# Patient Record
Sex: FEMALE | Race: WHITE | Employment: OTHER | URBAN - METROPOLITAN AREA
[De-identification: names, ages, dates, MRNs, and addresses within clinical notes are randomized per-mention and may not be internally consistent; named-entity substitution may affect disease eponyms.]

---

## 2017-02-08 ENCOUNTER — CLINICAL SUPPORT (OUTPATIENT)
Dept: CARDIOLOGY CLINIC | Age: 57
End: 2017-02-08

## 2017-02-08 DIAGNOSIS — R07.89 CHEST DISCOMFORT: Primary | ICD-10-CM

## 2017-02-08 DIAGNOSIS — E78.5 HYPERLIPIDEMIA, UNSPECIFIED HYPERLIPIDEMIA TYPE: ICD-10-CM

## 2017-02-09 ENCOUNTER — TELEPHONE (OUTPATIENT)
Dept: CARDIOLOGY CLINIC | Age: 57
End: 2017-02-09

## 2017-02-09 ENCOUNTER — DOCUMENTATION ONLY (OUTPATIENT)
Dept: CARDIOLOGY CLINIC | Age: 57
End: 2017-02-09

## 2017-02-09 NOTE — TELEPHONE ENCOUNTER
lft msg for pt that if any symptoms bothering her to keep up coming appt - if not to reschedule in 6 month.

## 2017-02-09 NOTE — TELEPHONE ENCOUNTER
Pt called and stated she received your message regarding her test results. She wants to verify if she needs to keep her f/u appointment scheduled on February 16, 2017 at 11:20. Please call her at [phone]. Thanks.

## 2017-07-20 ENCOUNTER — HOSPITAL ENCOUNTER (OUTPATIENT)
Dept: MAMMOGRAPHY | Age: 57
Discharge: HOME | End: 2017-07-20
Payer: COMMERCIAL

## 2017-07-20 DIAGNOSIS — Z12.31 VISIT FOR SCREENING MAMMOGRAM: ICD-10-CM

## 2017-07-20 PROCEDURE — 77067 SCR MAMMO BI INCL CAD: CPT

## 2018-07-23 ENCOUNTER — HOSPITAL ENCOUNTER (OUTPATIENT)
Dept: MAMMOGRAPHY | Age: 58
Discharge: HOME | End: 2018-07-23
Attending: FAMILY MEDICINE
Payer: COMMERCIAL

## 2018-07-23 DIAGNOSIS — Z12.39 BREAST SCREENING: ICD-10-CM

## 2018-07-23 PROCEDURE — 77067 SCR MAMMO BI INCL CAD: CPT

## 2019-07-26 ENCOUNTER — HOSPITAL ENCOUNTER (OUTPATIENT)
Dept: MAMMOGRAPHY | Age: 59
Discharge: HOME | End: 2019-07-26
Attending: FAMILY MEDICINE
Payer: COMMERCIAL

## 2019-07-26 DIAGNOSIS — Z12.39 SCREENING BREAST EXAMINATION: ICD-10-CM

## 2019-07-26 PROCEDURE — 77067 SCR MAMMO BI INCL CAD: CPT

## 2020-05-28 ENCOUNTER — DOCUMENTATION ONLY (OUTPATIENT)
Dept: CARDIOLOGY CLINIC | Age: 60
End: 2020-05-28

## 2020-06-24 ENCOUNTER — TELEPHONE (OUTPATIENT)
Dept: CARDIOLOGY CLINIC | Age: 60
End: 2020-06-24

## 2020-08-28 ENCOUNTER — HOSPITAL ENCOUNTER (OUTPATIENT)
Dept: MAMMOGRAPHY | Age: 60
Discharge: HOME | End: 2020-08-28
Attending: FAMILY MEDICINE
Payer: COMMERCIAL

## 2020-08-28 DIAGNOSIS — Z12.31 VISIT FOR SCREENING MAMMOGRAM: ICD-10-CM

## 2020-08-28 PROCEDURE — 77067 SCR MAMMO BI INCL CAD: CPT

## 2021-03-10 ENCOUNTER — HOSPITAL ENCOUNTER (EMERGENCY)
Age: 61
Discharge: HOME | End: 2021-03-10
Attending: EMERGENCY MEDICINE | Admitting: EMERGENCY MEDICINE
Payer: COMMERCIAL

## 2021-03-10 VITALS
HEART RATE: 79 BPM | SYSTOLIC BLOOD PRESSURE: 142 MMHG | OXYGEN SATURATION: 95 % | DIASTOLIC BLOOD PRESSURE: 79 MMHG | TEMPERATURE: 97.3 F | RESPIRATION RATE: 16 BRPM

## 2021-03-10 DIAGNOSIS — K62.5 RECTAL BLEEDING: Primary | ICD-10-CM

## 2021-03-10 LAB
ALBUMIN SERPL-MCNC: 4.4 G/DL (ref 3.5–5)
ALBUMIN/GLOB SERPL: 1.2 {RATIO} (ref 1.1–2.2)
ALP SERPL-CCNC: 62 U/L (ref 45–117)
ALT SERPL-CCNC: 29 U/L (ref 12–78)
ANION GAP SERPL CALC-SCNC: 7 MMOL/L (ref 5–15)
AST SERPL-CCNC: 14 U/L (ref 15–37)
BASOPHILS # BLD: 0.1 K/UL (ref 0–0.1)
BASOPHILS NFR BLD: 1 % (ref 0–1)
BILIRUB SERPL-MCNC: 0.6 MG/DL (ref 0.2–1)
BUN SERPL-MCNC: 15 MG/DL (ref 6–20)
BUN/CREAT SERPL: 20 (ref 12–20)
CALCIUM SERPL-MCNC: 10.4 MG/DL (ref 8.5–10.1)
CHLORIDE SERPL-SCNC: 107 MMOL/L (ref 97–108)
CO2 SERPL-SCNC: 24 MMOL/L (ref 21–32)
COMMENT, HOLDF: NORMAL
CREAT SERPL-MCNC: 0.75 MG/DL (ref 0.55–1.02)
DIFFERENTIAL METHOD BLD: NORMAL
EOSINOPHIL # BLD: 0.1 K/UL (ref 0–0.4)
EOSINOPHIL NFR BLD: 1 % (ref 0–7)
ERYTHROCYTE [DISTWIDTH] IN BLOOD BY AUTOMATED COUNT: 13.5 % (ref 11.5–14.5)
GLOBULIN SER CALC-MCNC: 3.8 G/DL (ref 2–4)
GLUCOSE SERPL-MCNC: 109 MG/DL (ref 65–100)
HCT VFR BLD AUTO: 41.2 % (ref 35–47)
HGB BLD-MCNC: 13.6 G/DL (ref 11.5–16)
IMM GRANULOCYTES # BLD AUTO: 0 K/UL (ref 0–0.04)
IMM GRANULOCYTES NFR BLD AUTO: 0 % (ref 0–0.5)
LYMPHOCYTES # BLD: 2.1 K/UL (ref 0.8–3.5)
LYMPHOCYTES NFR BLD: 26 % (ref 12–49)
MCH RBC QN AUTO: 27.8 PG (ref 26–34)
MCHC RBC AUTO-ENTMCNC: 33 G/DL (ref 30–36.5)
MCV RBC AUTO: 84.3 FL (ref 80–99)
MONOCYTES # BLD: 0.5 K/UL (ref 0–1)
MONOCYTES NFR BLD: 6 % (ref 5–13)
NEUTS SEG # BLD: 5.3 K/UL (ref 1.8–8)
NEUTS SEG NFR BLD: 66 % (ref 32–75)
NRBC # BLD: 0 K/UL (ref 0–0.01)
NRBC BLD-RTO: 0 PER 100 WBC
PLATELET # BLD AUTO: 309 K/UL (ref 150–400)
PMV BLD AUTO: 9.7 FL (ref 8.9–12.9)
POTASSIUM SERPL-SCNC: 4 MMOL/L (ref 3.5–5.1)
PROT SERPL-MCNC: 8.2 G/DL (ref 6.4–8.2)
RBC # BLD AUTO: 4.89 M/UL (ref 3.8–5.2)
SAMPLES BEING HELD,HOLD: NORMAL
SODIUM SERPL-SCNC: 138 MMOL/L (ref 136–145)
WBC # BLD AUTO: 8.1 K/UL (ref 3.6–11)

## 2021-03-10 PROCEDURE — 80053 COMPREHEN METABOLIC PANEL: CPT

## 2021-03-10 PROCEDURE — 99281 EMR DPT VST MAYX REQ PHY/QHP: CPT

## 2021-03-10 PROCEDURE — 85025 COMPLETE CBC W/AUTO DIFF WBC: CPT

## 2021-03-10 PROCEDURE — 36415 COLL VENOUS BLD VENIPUNCTURE: CPT

## 2021-03-10 NOTE — ED PROVIDER NOTES
HPI .  Patient has a history of pulmonic stenosis. She had a colonoscopy 5 years ago. She does not have any recall of having diverticulosis. Patient has not felt weak or dizzy or like she might faint. She is not losing weight. Patient noted bright red blood today. She had a nonbloody bowel movement followed by a bowel movement with blood in the toilet. She was seen in a primary care doctor office. She had gross blood on rectal exam and Hemoccult was sent. Patient has an appointment with the gastroenterologist in 2 days. This appointment was made because she is having a formed bowel movement each day then followed by a loose bowel movement which is a difference in her usual bowel habits. Patient denies abdominal pain.     Past Medical History:   Diagnosis Date    Contact dermatitis and other eczema, due to unspecified cause     Depression 4/5/2010    Hyperglycemia     recently started on metformin    Pulmonic stenosis        Past Surgical History:   Procedure Laterality Date    HX HYSTERECTOMY      IMPLANT BREAST SILICONE/EQ Bilateral 4014'M         Family History:   Problem Relation Age of Onset    Cancer Mother     Heart Disease Father     Cancer Sister     Breast Cancer Sister 62       Social History     Socioeconomic History    Marital status:      Spouse name: Not on file    Number of children: Not on file    Years of education: Not on file    Highest education level: Not on file   Occupational History    Not on file   Social Needs    Financial resource strain: Not on file    Food insecurity     Worry: Not on file     Inability: Not on file    Transportation needs     Medical: Not on file     Non-medical: Not on file   Tobacco Use    Smoking status: Never Smoker   Substance and Sexual Activity    Alcohol use: No    Drug use: No    Sexual activity: Yes     Partners: Male     Birth control/protection: None   Lifestyle    Physical activity     Days per week: Not on file Minutes per session: Not on file   • Stress: Not on file   Relationships   • Social connections     Talks on phone: Not on file     Gets together: Not on file     Attends Sikhism service: Not on file     Active member of club or organization: Not on file     Attends meetings of clubs or organizations: Not on file     Relationship status: Not on file   • Intimate partner violence     Fear of current or ex partner: Not on file     Emotionally abused: Not on file     Physically abused: Not on file     Forced sexual activity: Not on file   Other Topics Concern   • Not on file   Social History Narrative   • Not on file         ALLERGIES: Patient has no known allergies.    Review of Systems   All other systems reviewed and are negative.      Vitals:    03/10/21 1538   BP: (!) 142/79   Pulse: 79   Resp: 16   Temp: 97.3 °F (36.3 °C)   SpO2: 95%            Physical Exam  Vitals signs and nursing note reviewed.   Constitutional:       Appearance: She is well-developed.   HENT:      Head: Normocephalic and atraumatic.   Eyes:      Pupils: Pupils are equal, round, and reactive to light.   Neck:      Musculoskeletal: Normal range of motion and neck supple.   Cardiovascular:      Rate and Rhythm: Normal rate and regular rhythm.      Heart sounds: Normal heart sounds. No murmur. No friction rub. No gallop.    Pulmonary:      Effort: Pulmonary effort is normal. No respiratory distress.      Breath sounds: No wheezing or rales.   Abdominal:      Palpations: Abdomen is soft.      Tenderness: There is no abdominal tenderness. There is no rebound.   Musculoskeletal: Normal range of motion.         General: No tenderness.   Skin:     Findings: No erythema.   Neurological:      Mental Status: She is alert.      Cranial Nerves: No cranial nerve deficit.      Comments: Motor; symmetric   Psychiatric:         Behavior: Behavior normal.          MDM       Procedures

## 2021-03-10 NOTE — ED TRIAGE NOTES
Triage: Pt arrives from PCP's office with CC of blood in her stool. She reports two episodes of blood stool today. She is unable to quantify the amount of blood but describes it as having been in the water and seeing some clots. She is not on blood thinners. Hx of hemorrhoids. Denies abdominal pain.

## 2021-07-06 ENCOUNTER — TRANSCRIBE ORDER (OUTPATIENT)
Dept: SCHEDULING | Age: 61
End: 2021-07-06

## 2021-07-06 DIAGNOSIS — Z12.31 VISIT FOR SCREENING MAMMOGRAM: Primary | ICD-10-CM

## 2021-08-30 ENCOUNTER — HOSPITAL ENCOUNTER (OUTPATIENT)
Dept: MAMMOGRAPHY | Age: 61
Discharge: HOME | End: 2021-08-30
Attending: FAMILY MEDICINE
Payer: COMMERCIAL

## 2021-08-30 DIAGNOSIS — Z12.31 VISIT FOR SCREENING MAMMOGRAM: ICD-10-CM

## 2021-08-30 PROCEDURE — 77067 SCR MAMMO BI INCL CAD: CPT

## 2022-07-07 ENCOUNTER — TRANSCRIBE ORDER (OUTPATIENT)
Dept: SCHEDULING | Age: 62
End: 2022-07-07

## 2022-07-07 DIAGNOSIS — Z12.31 SCREENING MAMMOGRAM FOR HIGH-RISK PATIENT: Primary | ICD-10-CM

## 2022-08-25 LAB — HBA1C MFR BLD HPLC: 6.1 %

## 2022-08-31 ENCOUNTER — HOSPITAL ENCOUNTER (OUTPATIENT)
Dept: MAMMOGRAPHY | Age: 62
Discharge: HOME | End: 2022-08-31
Attending: FAMILY MEDICINE
Payer: COMMERCIAL

## 2022-08-31 DIAGNOSIS — Z12.31 SCREENING MAMMOGRAM FOR HIGH-RISK PATIENT: ICD-10-CM

## 2022-08-31 PROCEDURE — 77067 SCR MAMMO BI INCL CAD: CPT

## 2023-02-08 ENCOUNTER — OFFICE VISIT (OUTPATIENT)
Dept: CARDIOLOGY CLINIC | Age: 63
End: 2023-02-08
Payer: COMMERCIAL

## 2023-02-08 VITALS
WEIGHT: 181 LBS | OXYGEN SATURATION: 97 % | HEART RATE: 72 BPM | HEIGHT: 67 IN | DIASTOLIC BLOOD PRESSURE: 76 MMHG | BODY MASS INDEX: 28.41 KG/M2 | SYSTOLIC BLOOD PRESSURE: 120 MMHG

## 2023-02-08 DIAGNOSIS — E78.01 FAMILIAL HYPERCHOLESTEROLEMIA: ICD-10-CM

## 2023-02-08 DIAGNOSIS — Z82.49 FAMILY HISTORY OF ATRIAL FLUTTER: Primary | ICD-10-CM

## 2023-02-08 DIAGNOSIS — R60.0 LEG EDEMA: ICD-10-CM

## 2023-02-08 PROCEDURE — 99204 OFFICE O/P NEW MOD 45 MIN: CPT | Performed by: SPECIALIST

## 2023-02-08 PROCEDURE — 93000 ELECTROCARDIOGRAM COMPLETE: CPT | Performed by: SPECIALIST

## 2023-02-08 NOTE — PROGRESS NOTES
Dena Escudero is a 57 y.o. female    Vitals:    02/08/23 1547   BP: 120/76   BP 1 Location: Left upper arm   BP Patient Position: Sitting   BP Cuff Size: Adult   Pulse: 72   Height: 5' 7\" (1.702 m)   Weight: 181 lb (82.1 kg)   SpO2: 97%       Chief Complaint   Patient presents with    Irregular Heart Beat     FLUTTER       Chest pain YES  SOB NO  Dizziness NO  Swelling NO  Recent hospital visit NO  Refills NO  COVID VACCINE YES  HAD COVID?  YES

## 2023-02-08 NOTE — PROGRESS NOTES
CARDIOLOGY OFFICE NOTE    Sid Baez MD, [address], [place], [address] [facility]  Phone [phone]; Fax [phone]  Mobile [phone]   Voice Mail [phone]    Primary care: Yumi Mercer MD       ATTENTION:   This medical record was transcribed using an electronic medical records/speech recognition system. Although proofread, it may and can contain electronic, spelling and other errors. Corrections may be executed at a later time. Please feel free to contact us for any clarifications as needed. Khushbu Loya is a 57 y.o. female with  referred for patient's dyslipidemia          Cardiac risk factors: family history, dyslipidemia, post-menopausal  I have personally obtained the history from the patient. HISTORY OF PRESENTING ILLNESS    Ms./Mr. Khushbu Loya  57 y.o. was last seen in 2016. She has been doing well. She does note that in August she started experiencing some palpitations and since December has not had any further irregularity or rhythm. They were brief in nature. In the past I saw her for questionable history of pulmonary stenosis diagnosed as a child. There is a family history of heart disease.        ACTIVE PROBLEM LIST     Patient Active Problem List    Diagnosis Date Noted    Impaired fasting glucose 04/05/2010    Hyperlipidemia 04/05/2010    Leg edema 04/05/2010    Depression 04/05/2010    Obesity 04/05/2010           PAST MEDICAL HISTORY     Past Medical History:   Diagnosis Date    Contact dermatitis and other eczema, due to unspecified cause     Depression 4/5/2010    Hyperglycemia     recently started on metformin    Pulmonic stenosis            PAST SURGICAL HISTORY     Past Surgical History:   Procedure Laterality Date    HX HYSTERECTOMY      IMPLANT BREAST SILICONE/EQ Bilateral 1158'J          ALLERGIES     No Known Allergies       FAMILY HISTORY     Family History   Problem Relation Age of Onset Cancer Mother     Heart Disease Father     Cancer Sister     Breast Cancer Sister 62    negative for cardiac disease       SOCIAL HISTORY     Social History     Socioeconomic History    Marital status:    Tobacco Use    Smoking status: Never   Substance and Sexual Activity    Alcohol use: No    Drug use: No    Sexual activity: Yes     Partners: Male     Birth control/protection: None         MEDICATIONS     Current Outpatient Medications   Medication Sig    metFORMIN (GLUCOPHAGE) 500 mg tablet Take 1,000 mg by mouth two (2) times daily (with meals). ESCITALOPRAM OXALATE (LEXAPRO PO) Take  by mouth. hydrocortisone (HYTONE) 2.5 % topical cream Apply  to affected area two (2) times a day. use thin layer     No current facility-administered medications for this visit. I have reviewed the nurses notes, vitals, problem list, allergy list, medical history, family, social history and medications. REVIEW OF SYMPTOMS    As per HPI  General: Pt denies excessive weight gain or loss. Pt is able to conduct ADL's  HEENT: Denies blurred vision, headaches, hearing loss, epistaxis and difficulty swallowing. Respiratory: Denies cough, congestion, shortness of breath, MCKOY, wheezing or stridor. Cardiovascular: Denies precordial pain, palpitations, edema or PND  Gastrointestinal: Denies poor appetite, indigestion, abdominal pain or blood in stool  Genitourinary: Denies hematuria, dysuria, increased urinary frequency  Musculoskeletal: Denies joint pain or swelling from muscles or joints  Neurologic: Denies tremor, paresthesias, headache, or sensory motor disturbance  Psychiatric: Denies confusion, insomnia, depression  Integumentray: Denies rash, itching or ulcers. Hematologic: Denies easy bruising, bleeding     PHYSICAL EXAMINATION      Vitals:    02/08/23 1547   BP: 120/76   Pulse: 72   SpO2: 97%   Weight: 181 lb (82.1 kg)   Height: 5' 7\" (1.702 m)     General: Well developed, in no acute distress.   HEENT: No jaundice,  Neck: Supple, no stiffness, no lymphadenopathy, supple  Heart:  Normal S1/S2 negative S3 or S4. Regular, no murmur, gallop or rub, no jugular venous distention  Respiratory: Clear bilaterally x 4, no wheezing or rales  Extremities:  No edema, normal cap refill, no cyanosis. Musculoskeletal: No clubbing, no deformities  Neuro: A&Ox3, speech clear, gait stable, cooperative, no focal neurologic deficits  Skin: Skin color is normal. No rashes or lesions. Non diaphoretic, moist.  Vascular: 2+ pulses symmetric in all extremities  Abdomen:   Soft, non-tender, bowel sounds are active. EKG: Date: (2/8/2023) normal sinus rhythm     DIAGNOSTIC DATA     1. Lipids  4/5/16- , HDL 67, , TG 89  8/25/22- , HDL 65, , TG 76    2. Stress Echo  2/8/17- Normal    3. Echo  6/15/20- EF 60 - 65%, Probably trileaflet AV, Mild mitral annular calcification. LABORATORY DATA       Lab Results   Component Value Date/Time    WBC 8.1 03/10/2021 04:07 PM    Hemoglobin (POC) 13.9 03/12/2013 08:01 AM    HGB 13.6 03/10/2021 04:07 PM    Hematocrit (POC) 41 03/12/2013 08:01 AM    HCT 41.2 03/10/2021 04:07 PM    PLATELET 828 22/49/8470 04:07 PM    MCV 84.3 03/10/2021 04:07 PM      Lab Results   Component Value Date/Time    Sodium 138 03/10/2021 04:07 PM    Potassium 4.0 03/10/2021 04:07 PM    Chloride 107 03/10/2021 04:07 PM    CO2 24 03/10/2021 04:07 PM    Anion gap 7 03/10/2021 04:07 PM    Glucose 109 (H) 03/10/2021 04:07 PM    BUN 15 03/10/2021 04:07 PM    Creatinine 0.75 03/10/2021 04:07 PM    BUN/Creatinine ratio 20 03/10/2021 04:07 PM    GFR est AA >60 03/10/2021 04:07 PM    GFR est non-AA >60 03/10/2021 04:07 PM    Calcium 10.4 (H) 03/10/2021 04:07 PM    Bilirubin, total 0.6 03/10/2021 04:07 PM    Alk.  phosphatase 62 03/10/2021 04:07 PM    Protein, total 8.2 03/10/2021 04:07 PM    Albumin 4.4 03/10/2021 04:07 PM    Globulin 3.8 03/10/2021 04:07 PM    A-G Ratio 1.2 03/10/2021 04:07 PM    ALT (SGPT) 29 03/10/2021 04:07 PM            ICD-10-CM ICD-9-CM   1. Family history of atrial flutter  Z82.49 V17.49   2. Familial hypercholesterolemia  E78.01 272.0   3. Leg edema  R60.0 782.3        ASSESSMENT/RECOMMENDATIONS:.      1.  Palpitations  -Symptoms are improving I am not in a place an event monitor on her  -Gave her information on [employer] mobile  2. Elevated LDL of 120  -Gave her information on calcium scoring to determine whether or not we should be more aggressive in lowering her LDL. 3.  Questionable history of pulmonary stenosis as a child  -We will repeat echo  4. Follow-up with me in 8 weeks. Orders Placed This Encounter    AMB POC EKG ROUTINE W/ 12 LEADS, INTER & REP     Order Specific Question:   Reason for Exam:     Answer:   FLUTTER       We discussed the expected course, resolution and complications of the diagnosis(es) in detail. Medication risks, benefits, costs, interactions, and alternatives were discussed as indicated. I advised him to contact the office if his condition worsens, changes or fails to improve as anticipated. He expressed understanding with the diagnosis(es) and plan          Follow-up and Dispositions    Return in about 6 years (around 2/8/2029). I have discussed the diagnosis with  Claudette Sun and the intended plan as seen in the above orders. Questions were answered concerning future plans. I have discussed medication side effects and warnings with the patient as well. Thank you,  Ruth Boogie MD for involving me in the care of  Claudette Sun. Please do not hesitate to contact me for further questions/concerns. Sid Posadas MD, [facility] [address]      [facility], [address] [place], [place] [address], [place] [address]      [phone] / [phone] Fax

## 2023-02-08 NOTE — LETTER
2/8/2023    Patient: Misty Del Cid   YOB: 1960   Date of Visit: 2/8/2023     Abrahan Dooley, [address] [facility] [place] [address]  [facility] [id number]  Via Fax: [phone]    Dear Abrahan Dooley MD,      Thank you for referring Ms. Trudy Fang to [address] [place] [address] for evaluation. My notes for this consultation are attached. If you have questions, please do not hesitate to call me. I look forward to following your patient along with you.       Sincerely,    Lin Moran MD

## 2023-02-08 NOTE — PATIENT INSTRUCTIONS

## 2023-02-09 ENCOUNTER — TRANSCRIBE ORDER (OUTPATIENT)
Dept: SCHEDULING | Age: 63
End: 2023-02-09

## 2023-02-09 DIAGNOSIS — Z00.00 ROUTINE GENERAL MEDICAL EXAMINATION AT A HEALTH CARE FACILITY: Primary | ICD-10-CM

## 2023-02-17 ENCOUNTER — HOSPITAL ENCOUNTER (OUTPATIENT)
Dept: CT IMAGING | Age: 63
Discharge: HOME | End: 2023-02-17
Attending: INTERNAL MEDICINE
Payer: COMMERCIAL

## 2023-02-17 DIAGNOSIS — Z00.00 ROUTINE GENERAL MEDICAL EXAMINATION AT A HEALTH CARE FACILITY: ICD-10-CM

## 2023-02-17 PROCEDURE — 75571 CT HRT W/O DYE W/CA TEST: CPT

## 2023-02-20 ENCOUNTER — DOCUMENTATION ONLY (OUTPATIENT)
Dept: CARDIOLOGY CLINIC | Age: 63
End: 2023-02-20

## 2023-02-20 ENCOUNTER — TELEPHONE (OUTPATIENT)
Dept: CARDIOLOGY CLINIC | Age: 63
End: 2023-02-20

## 2023-02-20 DIAGNOSIS — E78.01 FAMILIAL HYPERCHOLESTEROLEMIA: Primary | ICD-10-CM

## 2023-02-20 DIAGNOSIS — R60.0 LEG EDEMA: ICD-10-CM

## 2023-02-20 DIAGNOSIS — Z82.49 FAMILY HISTORY OF ATRIAL FLUTTER: ICD-10-CM

## 2023-02-20 RX ORDER — ROSUVASTATIN CALCIUM 10 MG/1
10 TABLET, COATED ORAL
COMMUNITY
End: 2023-02-20 | Stop reason: SDUPTHER

## 2023-02-20 RX ORDER — ROSUVASTATIN CALCIUM 10 MG/1
10 TABLET, COATED ORAL
Qty: 30 TABLET | Refills: 5 | Status: SHIPPED | OUTPATIENT
Start: 2023-02-20

## 2023-02-20 NOTE — TELEPHONE ENCOUNTER
Patient is calling to speak with the nurse in regards to recent calcium score results. Please Advise.      [phone]

## 2023-02-20 NOTE — PROGRESS NOTES
She needs Rx for crestor 10 mg and repeat labs in 2 mo. Her calcium score was approx. 160's.     Gumaro Day MD

## 2023-02-28 ENCOUNTER — ANCILLARY PROCEDURE (OUTPATIENT)
Dept: CARDIOLOGY CLINIC | Age: 63
End: 2023-02-28
Payer: COMMERCIAL

## 2023-02-28 VITALS
BODY MASS INDEX: 28.41 KG/M2 | SYSTOLIC BLOOD PRESSURE: 122 MMHG | WEIGHT: 181 LBS | DIASTOLIC BLOOD PRESSURE: 72 MMHG | HEIGHT: 67 IN

## 2023-02-28 DIAGNOSIS — Z82.49 FAMILY HISTORY OF ATRIAL FLUTTER: ICD-10-CM

## 2023-02-28 DIAGNOSIS — E78.01 FAMILIAL HYPERCHOLESTEROLEMIA: ICD-10-CM

## 2023-02-28 DIAGNOSIS — R60.0 LEG EDEMA: ICD-10-CM

## 2023-02-28 LAB
ECHO AO ROOT DIAM: 3.2 CM
ECHO AO ROOT INDEX: 1.65 CM/M2
ECHO AV MEAN GRADIENT: 4 MMHG
ECHO AV MEAN VELOCITY: 1 M/S
ECHO AV PEAK GRADIENT: 7 MMHG
ECHO AV PEAK VELOCITY: 1.3 M/S
ECHO AV VELOCITY RATIO: 0.77
ECHO AV VTI: 32.4 CM
ECHO LA DIAMETER INDEX: 1.86 CM/M2
ECHO LA DIAMETER: 3.6 CM
ECHO LA TO AORTIC ROOT RATIO: 1.13
ECHO LA VOL 2C: 42 ML (ref 22–52)
ECHO LA VOL 4C: 51 ML (ref 22–52)
ECHO LA VOL BP: 48 ML (ref 22–52)
ECHO LA VOL/BSA BIPLANE: 25 ML/M2 (ref 16–34)
ECHO LA VOLUME AREA LENGTH: 51 ML
ECHO LA VOLUME INDEX A2C: 22 ML/M2 (ref 16–34)
ECHO LA VOLUME INDEX A4C: 26 ML/M2 (ref 16–34)
ECHO LA VOLUME INDEX AREA LENGTH: 26 ML/M2 (ref 16–34)
ECHO LV E' LATERAL VELOCITY: 7 CM/S
ECHO LV E' SEPTAL VELOCITY: 4 CM/S
ECHO LV EDV A2C: 73 ML
ECHO LV EDV A4C: 91 ML
ECHO LV EDV BP: 83 ML (ref 56–104)
ECHO LV EDV INDEX A4C: 47 ML/M2
ECHO LV EDV INDEX BP: 43 ML/M2
ECHO LV EDV NDEX A2C: 38 ML/M2
ECHO LV EJECTION FRACTION A2C: 58 %
ECHO LV EJECTION FRACTION A4C: 66 %
ECHO LV EJECTION FRACTION BIPLANE: 62 % (ref 55–100)
ECHO LV ESV A2C: 31 ML
ECHO LV ESV A4C: 31 ML
ECHO LV ESV BP: 32 ML (ref 19–49)
ECHO LV ESV INDEX A2C: 16 ML/M2
ECHO LV ESV INDEX A4C: 16 ML/M2
ECHO LV ESV INDEX BP: 16 ML/M2
ECHO LV FRACTIONAL SHORTENING: 34 % (ref 28–44)
ECHO LV INTERNAL DIMENSION DIASTOLE INDEX: 2.42 CM/M2
ECHO LV INTERNAL DIMENSION DIASTOLIC: 4.7 CM (ref 3.9–5.3)
ECHO LV INTERNAL DIMENSION SYSTOLIC INDEX: 1.6 CM/M2
ECHO LV INTERNAL DIMENSION SYSTOLIC: 3.1 CM
ECHO LV IVSD: 1.1 CM (ref 0.6–0.9)
ECHO LV MASS 2D: 175.8 G (ref 67–162)
ECHO LV MASS INDEX 2D: 90.6 G/M2 (ref 43–95)
ECHO LV POSTERIOR WALL DIASTOLIC: 1 CM (ref 0.6–0.9)
ECHO LV RELATIVE WALL THICKNESS RATIO: 0.43
ECHO LVOT AV VTI INDEX: 0.71
ECHO LVOT MEAN GRADIENT: 2 MMHG
ECHO LVOT PEAK GRADIENT: 4 MMHG
ECHO LVOT PEAK VELOCITY: 1 M/S
ECHO LVOT VTI: 22.9 CM
ECHO MV A VELOCITY: 0.97 M/S
ECHO MV AREA PHT: 4.9 CM2
ECHO MV E DECELERATION TIME (DT): 156.2 MS
ECHO MV E VELOCITY: 0.61 M/S
ECHO MV E/A RATIO: 0.63
ECHO MV E/E' LATERAL: 8.71
ECHO MV E/E' RATIO (AVERAGED): 11.98
ECHO MV E/E' SEPTAL: 15.25
ECHO MV PRESSURE HALF TIME (PHT): 45.3 MS
ECHO RV FREE WALL PEAK S': 13 CM/S
ECHO RV INTERNAL DIMENSION: 3.2 CM
ECHO RV TAPSE: 1.8 CM (ref 1.7–?)

## 2023-02-28 PROCEDURE — 93306 TTE W/DOPPLER COMPLETE: CPT | Performed by: SPECIALIST

## 2023-02-28 NOTE — PROGRESS NOTES
Your echocardiogram reveals normal heart function and this is great news.      All the best,    Tomas Herr

## 2023-07-17 ENCOUNTER — TRANSCRIBE ORDERS (OUTPATIENT)
Dept: SCHEDULING | Age: 63
End: 2023-07-17

## 2023-07-17 DIAGNOSIS — Z12.31 SCREENING MAMMOGRAM FOR HIGH-RISK PATIENT: Primary | ICD-10-CM

## 2023-08-19 LAB
ALBUMIN SERPL-MCNC: 4.4 G/DL (ref 3.9–4.9)
ALP SERPL-CCNC: 48 IU/L (ref 44–121)
ALT SERPL-CCNC: 10 IU/L (ref 0–32)
AST SERPL-CCNC: 11 IU/L (ref 0–40)
BILIRUB DIRECT SERPL-MCNC: 0.2 MG/DL (ref 0–0.4)
BILIRUB SERPL-MCNC: 0.6 MG/DL (ref 0–1.2)
CHOLEST SERPL-MCNC: 130 MG/DL (ref 100–199)
HDLC SERPL-MCNC: 59 MG/DL
IMP & REVIEW OF LAB RESULTS: NORMAL
LDLC SERPL CALC-MCNC: 59 MG/DL (ref 0–99)
PROT SERPL-MCNC: 6.3 G/DL (ref 6–8.5)
TRIGL SERPL-MCNC: 56 MG/DL (ref 0–149)
VLDLC SERPL CALC-MCNC: 12 MG/DL (ref 5–40)

## 2023-08-23 NOTE — PROGRESS NOTES
CARDIOLOGY OFFICE NOTE    Winston You MD, [facility] [address], [place]  Phone [phone]; Fax [phone]  Mobile [phone]   Voice Mail [phone]    Primary care: Kole Braun MD       ATTENTION:   This medical record was transcribed using an electronic medical records/speech recognition system. Although proofread, it may and can contain electronic, spelling and other errors. Corrections may be executed at a later time. Please feel free to contact us for any clarifications as needed. Martin Vance is a 57 y.o. female with  referred for  dyslipidemia            Cardiac risk factors: family history, dyslipidemia, post-menopausal   I have personally obtained the history from the patient. HISTORY OF PRESENTING ILLNESS    Ms./Mr. Martin Vance  57 y.o. is doing well since I last saw her. She has no chest pain or palpitations. Her echo demonstrated no evidence of pulmonary stenosis.   Cholesterol profile significantly better on 5 mg of Crestor a day coming down from 120-59 and is at goal.  She was placed on the statin secondary to elevated calcium score         ACTIVE PROBLEM LIST     Patient Active Problem List    Diagnosis Date Noted    Obesity 04/05/2010    Hyperlipidemia 04/05/2010    Impaired fasting glucose 04/05/2010    Leg edema 04/05/2010    Depression 04/05/2010           PAST MEDICAL HISTORY     Past Medical History:   Diagnosis Date    Contact dermatitis and other eczema, due to unspecified cause     Depression 4/5/2010    Hyperglycemia     recently started on metformin    Pulmonic stenosis            PAST SURGICAL HISTORY     Past Surgical History:   Procedure Laterality Date    HYSTERECTOMY (CERVIX STATUS UNKNOWN)      IMPLANT BREAST SILICONE/EQ Bilateral 1492'J          ALLERGIES     No Known Allergies       FAMILY HISTORY     Family History   Problem Relation Age of Onset    Breast Cancer Sister 62    Cancer

## 2023-08-23 NOTE — PATIENT INSTRUCTIONS
1. Lipids  4/5/16- , HDL 67, , TG 89  8/25/22- , HDL 65, , TG 76  8/18/23- , HDL 59, LDL 59, TG 56    2. Stress Echo  2/8/17- Normal    3. Echo  6/15/20- EF 60 - 65%, Probably trileaflet AV, Mild mitral annular calcification. 4.  Calcium scoring  2/17/2023: Total calcium score 159 with majority in the LAD and the remaining on the RCA  Total calcium score: 159        It is  my pleasure to have the opportunity to be involved in taking care of you and to provide you the best cardiac care. At the end of every visit I  encourage you to eat healthy and clean and reduce your red meat intake as well as exercise 30 minutes 5 days a week to ensure a healthy heart. If you are a smoker , it will be essential that you stop smoking to reduce your risk of heart disease. Part of providing you the best heart care possible IS AN ACCURATE KNOWLEDGE OF YOUR MEDICINE. It  will be  essential at each office visit that you provide me with an accurate list of your medicines. When you come into the office you should have that list or another option is lining up your pill bottles  and taking a snapshot with your cell phone of all the medicines you are taking currently and show it to the nurse in the examining room. Inaccurate reporting of your medication to the nurse may lead to adverse events and medical errors. Thank you again for giving me the opportunity to be part of your heart care and it is my pleasure. All the best,    Mark A. Reynolds Sicard, MD

## 2023-08-24 ENCOUNTER — OFFICE VISIT (OUTPATIENT)
Age: 63
End: 2023-08-24
Payer: COMMERCIAL

## 2023-08-24 VITALS
OXYGEN SATURATION: 98 % | BODY MASS INDEX: 26.52 KG/M2 | SYSTOLIC BLOOD PRESSURE: 110 MMHG | DIASTOLIC BLOOD PRESSURE: 70 MMHG | HEIGHT: 68 IN | WEIGHT: 175 LBS | RESPIRATION RATE: 16 BRPM | HEART RATE: 77 BPM

## 2023-08-24 DIAGNOSIS — E78.01 FAMILIAL HYPERCHOLESTEROLEMIA: Primary | ICD-10-CM

## 2023-08-24 DIAGNOSIS — R60.0 LOCALIZED EDEMA: ICD-10-CM

## 2023-08-24 DIAGNOSIS — Z82.49 FAMILY HISTORY OF ISCHEMIC HEART DISEASE AND OTHER DISEASES OF THE CIRCULATORY SYSTEM: ICD-10-CM

## 2023-08-24 DIAGNOSIS — E78.01 FAMILIAL HYPERCHOLESTEROLEMIA: ICD-10-CM

## 2023-08-24 PROCEDURE — 99214 OFFICE O/P EST MOD 30 MIN: CPT | Performed by: SPECIALIST

## 2023-08-24 RX ORDER — ESCITALOPRAM OXALATE 10 MG/1
10 TABLET ORAL EVERY MORNING
COMMUNITY
Start: 2023-06-27

## 2023-08-24 ASSESSMENT — PATIENT HEALTH QUESTIONNAIRE - PHQ9
SUM OF ALL RESPONSES TO PHQ QUESTIONS 1-9: 0
SUM OF ALL RESPONSES TO PHQ9 QUESTIONS 1 & 2: 0
9. THOUGHTS THAT YOU WOULD BE BETTER OFF DEAD, OR OF HURTING YOURSELF: 0
3. TROUBLE FALLING OR STAYING ASLEEP: 0
8. MOVING OR SPEAKING SO SLOWLY THAT OTHER PEOPLE COULD HAVE NOTICED. OR THE OPPOSITE, BEING SO FIGETY OR RESTLESS THAT YOU HAVE BEEN MOVING AROUND A LOT MORE THAN USUAL: 0
10. IF YOU CHECKED OFF ANY PROBLEMS, HOW DIFFICULT HAVE THESE PROBLEMS MADE IT FOR YOU TO DO YOUR WORK, TAKE CARE OF THINGS AT HOME, OR GET ALONG WITH OTHER PEOPLE: 0
5. POOR APPETITE OR OVEREATING: 0
4. FEELING TIRED OR HAVING LITTLE ENERGY: 0
6. FEELING BAD ABOUT YOURSELF - OR THAT YOU ARE A FAILURE OR HAVE LET YOURSELF OR YOUR FAMILY DOWN: 0
SUM OF ALL RESPONSES TO PHQ QUESTIONS 1-9: 0
1. LITTLE INTEREST OR PLEASURE IN DOING THINGS: 0
7. TROUBLE CONCENTRATING ON THINGS, SUCH AS READING THE NEWSPAPER OR WATCHING TELEVISION: 0
2. FEELING DOWN, DEPRESSED OR HOPELESS: 0

## 2023-08-29 ENCOUNTER — TELEPHONE (OUTPATIENT)
Age: 63
End: 2023-08-29

## 2023-08-29 RX ORDER — ROSUVASTATIN CALCIUM 10 MG/1
10 TABLET, COATED ORAL NIGHTLY
Qty: 30 TABLET | Refills: 5 | Status: SHIPPED | OUTPATIENT
Start: 2023-08-29

## 2023-08-29 NOTE — TELEPHONE ENCOUNTER
Pt is calling because she needs the prescription sent into her pharmacy for Rosuvastatin 10 mg.Per the doctor conversation.     [phone] patient

## 2023-09-01 ENCOUNTER — HOSPITAL ENCOUNTER (OUTPATIENT)
Facility: HOSPITAL | Age: 63
End: 2023-09-01
Payer: MEDICARE

## 2023-09-01 DIAGNOSIS — Z12.31 SCREENING MAMMOGRAM FOR HIGH-RISK PATIENT: ICD-10-CM

## 2023-09-01 PROCEDURE — 77067 SCR MAMMO BI INCL CAD: CPT

## 2024-02-28 RX ORDER — ROSUVASTATIN CALCIUM 10 MG/1
10 TABLET, COATED ORAL NIGHTLY
Qty: 90 TABLET | Refills: 0 | Status: SHIPPED | OUTPATIENT
Start: 2024-02-28

## 2024-03-08 ENCOUNTER — HOSPITAL ENCOUNTER (OUTPATIENT)
Facility: HOSPITAL | Age: 64
End: 2024-03-08
Payer: MEDICARE

## 2024-03-08 DIAGNOSIS — M54.16 LUMBAR RADICULOPATHY: ICD-10-CM

## 2024-03-08 DIAGNOSIS — M51.36 DDD (DEGENERATIVE DISC DISEASE), LUMBAR: ICD-10-CM

## 2024-03-08 PROCEDURE — 72148 MRI LUMBAR SPINE W/O DYE: CPT

## 2024-05-19 NOTE — PATIENT INSTRUCTIONS

## 2024-05-19 NOTE — PROGRESS NOTES
CARDIOLOGY OFFICE NOTE    Winston Milner MD, FACC    [address] [address], [place] [address]  Phone [phone]; Fax [phone]  Mobile [phone]   Voice Mail [phone]    Primary care: No primary care provider on file.       ATTENTION:   This medical record was transcribed using an electronic medical records/speech recognition system.  Although proofread, it may and can contain electronic, spelling and other errors.  Corrections may be executed at a later time.  Please feel free to contact us for any clarifications as needed.             Judit Caba is a 63 y.o. female with  referred for  dyslipidemia            Cardiac risk factors: family history, dyslipidemia, post-menopausal   I have personally obtained the history from the patient.        HISTORY OF PRESENTING ILLNESS    Ms./Mr. Judit Caba  63 y.o. is doing well with no interval cardiac issues.  I reviewed her cholesterol profiles which are excellent.  She denies any significant arrhythmias.         ACTIVE PROBLEM LIST     Patient Active Problem List    Diagnosis Date Noted    Obesity 04/05/2010    Hyperlipidemia 04/05/2010    Impaired fasting glucose 04/05/2010    Leg edema 04/05/2010    Depression 04/05/2010           PAST MEDICAL HISTORY     Past Medical History:   Diagnosis Date    Contact dermatitis and other eczema, due to unspecified cause     Depression 4/5/2010    Hyperglycemia     recently started on metformin    Pulmonic stenosis            PAST SURGICAL HISTORY     Past Surgical History:   Procedure Laterality Date    HYSTERECTOMY (CERVIX STATUS UNKNOWN)      IMPLANT BREAST SILICONE/EQ Bilateral 1980's          ALLERGIES     No Known Allergies       FAMILY HISTORY     Family History   Problem Relation Age of Onset    Breast Cancer Sister 57    Cancer Sister     Heart Disease Father     Cancer Mother     negative for cardiac disease       SOCIAL HISTORY     Social History     Socioeconomic

## 2024-05-22 ENCOUNTER — OFFICE VISIT (OUTPATIENT)
Age: 64
End: 2024-05-22
Payer: MEDICARE

## 2024-05-22 VITALS
OXYGEN SATURATION: 98 % | BODY MASS INDEX: 26.68 KG/M2 | DIASTOLIC BLOOD PRESSURE: 68 MMHG | SYSTOLIC BLOOD PRESSURE: 110 MMHG | HEIGHT: 67 IN | WEIGHT: 170 LBS | HEART RATE: 71 BPM

## 2024-05-22 DIAGNOSIS — R60.0 LOCALIZED EDEMA: ICD-10-CM

## 2024-05-22 DIAGNOSIS — Z82.49 FAMILY HISTORY OF ISCHEMIC HEART DISEASE AND OTHER DISEASES OF THE CIRCULATORY SYSTEM: ICD-10-CM

## 2024-05-22 DIAGNOSIS — E78.01 FAMILIAL HYPERCHOLESTEROLEMIA: Primary | ICD-10-CM

## 2024-05-22 LAB
ALBUMIN SERPL-MCNC: 4.5 G/DL (ref 3.9–4.9)
ALP SERPL-CCNC: 45 IU/L (ref 44–121)
ALT SERPL-CCNC: 14 IU/L (ref 0–32)
AST SERPL-CCNC: 16 IU/L (ref 0–40)
BILIRUB DIRECT SERPL-MCNC: 0.19 MG/DL (ref 0–0.4)
BILIRUB SERPL-MCNC: 0.6 MG/DL (ref 0–1.2)
CHOLEST SERPL-MCNC: 118 MG/DL (ref 100–199)
HDLC SERPL-MCNC: 65 MG/DL
IMP & REVIEW OF LAB RESULTS: NORMAL
LDLC SERPL CALC-MCNC: 37 MG/DL (ref 0–99)
PROT SERPL-MCNC: 6.5 G/DL (ref 6–8.5)
TRIGL SERPL-MCNC: 85 MG/DL (ref 0–149)
VLDLC SERPL CALC-MCNC: 16 MG/DL (ref 5–40)

## 2024-05-22 PROCEDURE — G8427 DOCREV CUR MEDS BY ELIG CLIN: HCPCS | Performed by: SPECIALIST

## 2024-05-22 PROCEDURE — 93005 ELECTROCARDIOGRAM TRACING: CPT | Performed by: SPECIALIST

## 2024-05-22 PROCEDURE — 99213 OFFICE O/P EST LOW 20 MIN: CPT | Performed by: SPECIALIST

## 2024-05-22 PROCEDURE — 1036F TOBACCO NON-USER: CPT | Performed by: SPECIALIST

## 2024-05-22 PROCEDURE — 1090F PRES/ABSN URINE INCON ASSESS: CPT | Performed by: SPECIALIST

## 2024-05-22 PROCEDURE — 1123F ACP DISCUSS/DSCN MKR DOCD: CPT | Performed by: SPECIALIST

## 2024-05-22 PROCEDURE — 93010 ELECTROCARDIOGRAM REPORT: CPT | Performed by: SPECIALIST

## 2024-05-22 PROCEDURE — 3017F COLORECTAL CA SCREEN DOC REV: CPT | Performed by: SPECIALIST

## 2024-05-22 PROCEDURE — G8400 PT W/DXA NO RESULTS DOC: HCPCS | Performed by: SPECIALIST

## 2024-05-22 PROCEDURE — G8419 CALC BMI OUT NRM PARAM NOF/U: HCPCS | Performed by: SPECIALIST

## 2024-05-22 NOTE — PROGRESS NOTES
Chief Complaint   Patient presents with    Palpitations    Edema     Vitals:    24 1522   BP: 110/68   Site: Left Upper Arm   Position: Sitting   Pulse: 71   SpO2: 98%   Weight: 77.1 kg (170 lb)   Height: 1.702 m (5' 7\")     Chest pain: DENIED     Recent hospital stays: DENIED     Refills: DENIED     NAME Judit Caba         1960      MRN    [id number]      LAST OFFICE APPOINTMENT: 2023     DIAGNOSIS    ICD-10-CM    1. Familial hypercholesterolemia  E78.01       2. Localized edema  R60.0 EKG 12 Lead      3. Family history of ischemic heart disease and other diseases of the circulatory system  Z82.49           HOME MEDICATION  Current Outpatient Medications   Medication Sig    rosuvastatin (CRESTOR) 10 MG tablet TAKE 1 TABLET BY MOUTH EVERY NIGHT    escitalopram (LEXAPRO) 10 MG tablet Take 1 tablet by mouth every morning    metFORMIN (GLUCOPHAGE) 500 MG tablet Take 2 tablets by mouth 2 times daily (with meals)    hydrocortisone 2.5 % cream Apply topically 2 times daily (Patient not taking: Reported on 2024)     No current facility-administered medications for this visit.       VITAL SIGNS  Wt Readings from Last 3 Encounters:   24 77.1 kg (170 lb)   23 79.4 kg (175 lb)   23 82.1 kg (181 lb)     BP Readings from Last 3 Encounters:   24 110/68   23 110/70   23 122/72     Pulse Readings from Last 3 Encounters:   24 71   23 77   23 72         SPECIALTY COMMENTS  1. Lipids  16- , HDL 67, , TG 89  22- , HDL 65, , TG 76  23- , HDL 59, LDL 59, TG 56  24- , HDL 65, LDL 37, TG 85    2. Stress Echo  17- Normal    3. Echo  6/15/20- EF 60 - 65%, Probably trileaflet AV, Mild mitral annular calcification.  23- EF 62%, Tricuspid AV-Mild sclerosis of AV cusp, Mild annular calcification of MV    4.  Calcium scoring  2023: Total calcium score 159 with majority in the LAD and the

## 2024-05-28 RX ORDER — ROSUVASTATIN CALCIUM 10 MG/1
10 TABLET, COATED ORAL NIGHTLY
Qty: 90 TABLET | Refills: 3 | Status: SHIPPED | OUTPATIENT
Start: 2024-05-28

## 2024-07-18 ENCOUNTER — TELEPHONE (OUTPATIENT)
Age: 64
End: 2024-07-18

## 2024-08-12 ENCOUNTER — TRANSCRIBE ORDERS (OUTPATIENT)
Facility: HOSPITAL | Age: 64
End: 2024-08-12

## 2024-08-12 DIAGNOSIS — Z12.31 ENCOUNTER FOR SCREENING MAMMOGRAM FOR MALIGNANT NEOPLASM OF BREAST: Primary | ICD-10-CM

## 2024-09-10 ENCOUNTER — HOSPITAL ENCOUNTER (OUTPATIENT)
Facility: HOSPITAL | Age: 64
Discharge: HOME | End: 2024-09-13
Payer: MEDICARE

## 2024-09-10 VITALS — BODY MASS INDEX: 26.68 KG/M2 | WEIGHT: 170 LBS | HEIGHT: 67 IN

## 2024-09-10 DIAGNOSIS — Z12.31 ENCOUNTER FOR SCREENING MAMMOGRAM FOR MALIGNANT NEOPLASM OF BREAST: ICD-10-CM

## 2024-09-10 PROCEDURE — 77067 SCR MAMMO BI INCL CAD: CPT

## 2025-01-17 ENCOUNTER — ANESTHESIA EVENT (OUTPATIENT)
Facility: HOSPITAL | Age: 65
End: 2025-01-17
Payer: MEDICARE

## 2025-01-17 ENCOUNTER — ANESTHESIA (OUTPATIENT)
Facility: HOSPITAL | Age: 65
End: 2025-01-17
Payer: MEDICARE

## 2025-01-17 ENCOUNTER — HOSPITAL ENCOUNTER (OUTPATIENT)
Facility: HOSPITAL | Age: 65
Setting detail: OUTPATIENT SURGERY
Discharge: HOME | End: 2025-01-17
Attending: INTERNAL MEDICINE | Admitting: INTERNAL MEDICINE
Payer: MEDICARE

## 2025-01-17 VITALS
RESPIRATION RATE: 11 BRPM | BODY MASS INDEX: 24.1 KG/M2 | DIASTOLIC BLOOD PRESSURE: 57 MMHG | HEIGHT: 68 IN | TEMPERATURE: 97.6 F | SYSTOLIC BLOOD PRESSURE: 121 MMHG | WEIGHT: 159 LBS | HEART RATE: 55 BPM | OXYGEN SATURATION: 96 %

## 2025-01-17 PROCEDURE — 7100000010 HC PHASE II RECOVERY - FIRST 15 MIN: Performed by: INTERNAL MEDICINE

## 2025-01-17 PROCEDURE — 7100000011 HC PHASE II RECOVERY - ADDTL 15 MIN: Performed by: INTERNAL MEDICINE

## 2025-01-17 PROCEDURE — 2709999900 HC NON-CHARGEABLE SUPPLY: Performed by: INTERNAL MEDICINE

## 2025-01-17 PROCEDURE — 3600007512: Performed by: INTERNAL MEDICINE

## 2025-01-17 PROCEDURE — 3700000001 HC ADD 15 MINUTES (ANESTHESIA): Performed by: INTERNAL MEDICINE

## 2025-01-17 PROCEDURE — 3700000000 HC ANESTHESIA ATTENDED CARE: Performed by: INTERNAL MEDICINE

## 2025-01-17 PROCEDURE — 6360000002 HC RX W HCPCS: Performed by: NURSE PRACTITIONER

## 2025-01-17 PROCEDURE — 3600007502: Performed by: INTERNAL MEDICINE

## 2025-01-17 PROCEDURE — 2580000003 HC RX 258: Performed by: NURSE PRACTITIONER

## 2025-01-17 RX ORDER — SODIUM CHLORIDE 0.9 % (FLUSH) 0.9 %
5-40 SYRINGE (ML) INJECTION PRN
Status: CANCELLED | OUTPATIENT
Start: 2025-01-17

## 2025-01-17 RX ORDER — SODIUM CHLORIDE 9 MG/ML
INJECTION, SOLUTION INTRAVENOUS
Status: DISCONTINUED | OUTPATIENT
Start: 2025-01-17 | End: 2025-01-17 | Stop reason: SDUPTHER

## 2025-01-17 RX ORDER — SODIUM CHLORIDE 9 MG/ML
INJECTION, SOLUTION INTRAVENOUS CONTINUOUS
Status: CANCELLED | OUTPATIENT
Start: 2025-01-17

## 2025-01-17 RX ORDER — MINOXIDIL 2.5 MG/1
TABLET ORAL
COMMUNITY

## 2025-01-17 RX ORDER — SODIUM CHLORIDE 9 MG/ML
INJECTION, SOLUTION INTRAVENOUS PRN
Status: CANCELLED | OUTPATIENT
Start: 2025-01-17

## 2025-01-17 RX ORDER — SODIUM CHLORIDE 0.9 % (FLUSH) 0.9 %
5-40 SYRINGE (ML) INJECTION EVERY 12 HOURS SCHEDULED
Status: CANCELLED | OUTPATIENT
Start: 2025-01-17

## 2025-01-17 RX ADMIN — PROPOFOL 30 MG: 10 INJECTION, EMULSION INTRAVENOUS at 08:15

## 2025-01-17 RX ADMIN — PROPOFOL 30 MG: 10 INJECTION, EMULSION INTRAVENOUS at 08:17

## 2025-01-17 RX ADMIN — PROPOFOL 50 MG: 10 INJECTION, EMULSION INTRAVENOUS at 08:09

## 2025-01-17 RX ADMIN — SODIUM CHLORIDE: 9 INJECTION, SOLUTION INTRAVENOUS at 08:04

## 2025-01-17 RX ADMIN — PROPOFOL 30 MG: 10 INJECTION, EMULSION INTRAVENOUS at 08:13

## 2025-01-17 RX ADMIN — PROPOFOL 100 MG: 10 INJECTION, EMULSION INTRAVENOUS at 08:07

## 2025-01-17 RX ADMIN — PROPOFOL 30 MG: 10 INJECTION, EMULSION INTRAVENOUS at 08:21

## 2025-01-17 RX ADMIN — PROPOFOL 30 MG: 10 INJECTION, EMULSION INTRAVENOUS at 08:19

## 2025-01-17 RX ADMIN — PROPOFOL 30 MG: 10 INJECTION, EMULSION INTRAVENOUS at 08:11

## 2025-01-17 ASSESSMENT — PAIN - FUNCTIONAL ASSESSMENT: PAIN_FUNCTIONAL_ASSESSMENT: NONE - DENIES PAIN

## 2025-01-17 NOTE — OP NOTE
[facility]  [address]  [place] [address] [phone]                              Colonoscopy Procedure Note      Indications:    Change in bowel habits, Personal history of colon polyps (screening only)     :  Andrea Quintero MD    Surgical Assistant: Circulator: Conchita Bernal RN  Endoscopy Technician: Petty Lopez    Implants: none    Referring Provider: No, Pcp    Sedation:  MAC anesthesia    Procedure Details:  After informed consent was obtained with all risks and benefits of procedure explained and preoperative exam completed, the patient was taken to the endoscopy suite and placed in the left lateral decubitus position.  Upon sequential sedation as per above, a digital rectal exam was performed  And was normal.  The Olympus videocolonoscope  was inserted in the rectum and carefully advanced to the cecum, which was identified by the ileocecal valve and appendiceal orifice.  The quality of preparation was good.  The colonoscope was slowly withdrawn with careful evaluation between folds. Retroflexion in the rectum was performed and was normal..     Findings:   Rectum: no mucosal lesion appreciated  Grade 1 internal and external hemorrhoid(s);  Sigmoid: no mucosal lesion appreciated  Descending Colon: no mucosal lesion appreciated  Transverse Colon: no mucosal lesion appreciated  Ascending Colon: no mucosal lesion appreciated  Cecum: no mucosal lesion appreciated  Terminal Ileum: not intubated    Interventions:  none    Specimen Removed:  * No specimens in log *    Complications: None.     EBL:  None.    Recommendations:   -Repeat colonoscopy in 5 years.  -High fiber diet.     -Resume normal medication(s).     Discharge Disposition:  Home in the company of a  when able to ambulate.    Andrea Quintero MD  1/17/2025  8:26 AM

## 2025-01-17 NOTE — ANESTHESIA POSTPROCEDURE EVALUATION
Department of Anesthesiology  Postprocedure Note    Patient: Judit Caba  MRN: [id number]  YOB: 1960  Date of evaluation: 1/17/2025    Procedure Summary       Date: 01/17/25 Room / Location: [facility] 01 / [facility] ENDOSCOPY    Anesthesia Start: 0804 Anesthesia Stop: 0825    Procedure: COLONOSCOPY DIAGNOSTIC Diagnosis:       Rectal bleeding      (Rectal bleeding [K62.5])    Surgeons: Andrea Quintero MD Responsible Provider: Sapna Jones DO    Anesthesia Type: MAC ASA Status: 2            Anesthesia Type: MAC    Kim Phase I: Kim Score: 10    Kim Phase II: Kim Score: 10    Anesthesia Post Evaluation    Patient location during evaluation: PACU  Level of consciousness: awake  Airway patency: patent  Nausea & Vomiting: no nausea  Cardiovascular status: hemodynamically stable  Respiratory status: acceptable  Hydration status: stable  Multimodal analgesia pain management approach  Pain management: adequate    No notable events documented.

## 2025-01-17 NOTE — H&P
[facility]  [address]  [place] [address] [phone]                                History and Physical     NAME: Judit Caba   :  1960   MRN:  [id number]     HPI:  The patient was seen and examined.    Past Surgical History:   Procedure Laterality Date    BREAST ENHANCEMENT SURGERY      HYSTERECTOMY (CERVIX STATUS UNKNOWN)      IMPLANT BREAST SILICONE/EQ Bilateral      Past Medical History:   Diagnosis Date    Contact dermatitis and other eczema, due to unspecified cause     Depression 2010    Hyperglycemia     recently started on metformin    Pulmonic stenosis      Social History     Tobacco Use    Smoking status: Never   Substance Use Topics    Alcohol use: No    Drug use: No     No Known Allergies  Family History   Problem Relation Age of Onset    Breast Cancer Sister 57    Cancer Sister     Heart Disease Father     Cancer Mother      No current facility-administered medications for this encounter.         PHYSICAL EXAM:  General: WD, WN. Alert, cooperative, no acute distress    HEENT: NC, Atraumatic.  PERRLA, EOMI. Anicteric sclerae.  Lungs:  CTA Bilaterally. No Wheezing/Rhonchi/Rales.  Heart:  Regular  rhythm,  No murmur, No Rubs, No Gallops  Abdomen: Soft, Non distended, Non tender.  +Bowel sounds, no HSM  Extremities: No c/c/e  Neurologic:  CN 2-12 gi, Alert and oriented X 3.  No acute neurological distress   Psych:   Good insight. Not anxious nor agitated.    The heart, lungs and mental status were satisfactory for the administration of MAC sedation and for the procedure.      Mallampati score: 2     The patient was counseled at length about the risks of gurvinder Covid-19 in the keshia-operative and post-operative states including the recovery window of their procedure.  The patient was made aware that gurvinder Covid-19 after a surgical procedure may worsen their prognosis for recovering from the virus and lend to a higher morbidity and or

## 2025-01-17 NOTE — ANESTHESIA PRE PROCEDURE
Answered      Vital Signs (Current): There were no vitals filed for this visit.                                           BP Readings from Last 3 Encounters:   05/22/24 110/68   08/24/23 110/70   02/28/23 122/72       NPO Status: Time of last liquid consumption: 0100                                                 Date of last liquid consumption: 01/17/25                        Date of last solid food consumption: 01/15/25    BMI:   Wt Readings from Last 3 Encounters:   09/10/24 77.1 kg (170 lb)   05/22/24 77.1 kg (170 lb)   08/24/23 79.4 kg (175 lb)     There is no height or weight on file to calculate BMI.    CBC:   Lab Results   Component Value Date/Time    WBC 8.1 03/10/2021 04:07 PM    RBC 4.89 03/10/2021 04:07 PM    HGB 13.6 03/10/2021 04:07 PM    HCT 41.2 03/10/2021 04:07 PM    MCV 84.3 03/10/2021 04:07 PM    RDW 13.5 03/10/2021 04:07 PM     03/10/2021 04:07 PM       CMP:   Lab Results   Component Value Date/Time     03/10/2021 04:07 PM    K 4.0 03/10/2021 04:07 PM     03/10/2021 04:07 PM    CO2 24 03/10/2021 04:07 PM    BUN 15 03/10/2021 04:07 PM    CREATININE 0.75 03/10/2021 04:07 PM    GFRAA >60 03/10/2021 04:07 PM    AGRATIO 1.2 03/10/2021 04:07 PM    GLUCOSE 109 03/10/2021 04:07 PM    CALCIUM 10.4 03/10/2021 04:07 PM    BILITOT 0.6 05/21/2024 01:14 PM    ALKPHOS 45 05/21/2024 01:14 PM    AST 16 05/21/2024 01:14 PM    ALT 14 05/21/2024 01:14 PM       POC Tests: No results for input(s): \"POCGLU\", \"POCNA\", \"POCK\", \"POCCL\", \"POCBUN\", \"POCHEMO\", \"POCHCT\" in the last 72 hours.    Coags: No results found for: \"PROTIME\", \"INR\", \"APTT\"    HCG (If Applicable): No results found for: \"PREGTESTUR\", \"PREGSERUM\", \"HCG\", \"HCGQUANT\"     ABGs: No results found for: \"PHART\", \"PO2ART\", \"RVU7MXP\", \"VBL9PBX\", \"BEART\", \"U5FTSNHI\"     Type & Screen (If Applicable):  No results found for: \"ABORH\", \"LABANTI\"    Drug/Infectious Status (If Applicable):  No results found for: \"HIV\", \"HEPCAB\"    COVID-19 Screening

## 2025-01-17 NOTE — PROGRESS NOTES

## 2025-01-17 NOTE — DISCHARGE INSTRUCTIONS
[facility]  [address]  [place] [address]  [phone]                                  Judit Caba  [id number]  1960    COLON DISCHARGE INSTRUCTIONS    DISCOMFORT:  Redness at IV site- apply warm compress to area; if redness or soreness persist- contact your physician  There may be a slight amount of blood passed from the rectum  Gaseous discomfort- walking, belching will help relieve any discomfort      DIET:   High fiber diet.   - however -  remember your colon is empty and a heavy meal will produce gas.   Avoid these foods:  vegetables, fried / greasy foods, carbonated drinks for today  You may not  drink alcoholic beverages for at least 12 hours     ACTIVITY:  It is recommended that you spend the remainder of the day resting -  avoid any strenuous activity.  You may not operate a vehicle for 12 hours  You may not  engage in an occupation involving machinery or appliances for rest of today    Avoid making any critical decisions for at least 24 hour    CALL M.D.  ANY SIGN OF:   Increasing pain, nausea, vomiting  Abdominal distension (swelling)  New increased bleeding (oral or rectal)  Fever (chills)  Pain in chest area  Bloody discharge from nose or mouth  Shortness of breath    You may not  take any Advil, Aspirin, Ibuprofen, Motrin, Aleve, or Goody’s for 3 days, ONLY  Tylenol as needed for pain.    Post procedure diagnosis:   Hemorrhoids    Post-procedure recommendations:  -Repeat colonoscopy in 5 years.  -High fiber diet.     -Resume normal medication(s).     Follow-up Instructions:    Call Dr. Quintero for any questions or problems.     If we took a biopsy please call the office within 2 weeks to discuss your  pathology results. Telephone # [phone]         Learning About Coronavirus (COVID-19)  Coronavirus (COVID-19): Overview  What is coronavirus (COVID-19)?  The coronavirus disease (COVID-19) is caused by a virus. It is an illness that was first found in

## 2025-06-30 RX ORDER — ROSUVASTATIN CALCIUM 10 MG/1
10 TABLET, COATED ORAL NIGHTLY
Qty: 90 TABLET | Refills: 3 | OUTPATIENT
Start: 2025-06-30

## 2025-08-01 ENCOUNTER — TRANSCRIBE ORDERS (OUTPATIENT)
Facility: HOSPITAL | Age: 65
End: 2025-08-01

## 2025-08-01 DIAGNOSIS — Z12.31 OTHER SCREENING MAMMOGRAM: Primary | ICD-10-CM

## (undated) DEVICE — SUPPLEMENT DIGESTIVE H2O SOL GI-EASE